# Patient Record
Sex: FEMALE | Race: OTHER | HISPANIC OR LATINO
[De-identification: names, ages, dates, MRNs, and addresses within clinical notes are randomized per-mention and may not be internally consistent; named-entity substitution may affect disease eponyms.]

---

## 2019-11-04 ENCOUNTER — APPOINTMENT (OUTPATIENT)
Dept: OTOLARYNGOLOGY | Facility: CLINIC | Age: 24
End: 2019-11-04

## 2019-11-04 PROBLEM — Z00.00 ENCOUNTER FOR PREVENTIVE HEALTH EXAMINATION: Status: ACTIVE | Noted: 2019-11-04

## 2019-11-26 ENCOUNTER — OUTPATIENT (OUTPATIENT)
Dept: OUTPATIENT SERVICES | Facility: HOSPITAL | Age: 24
LOS: 1 days | End: 2019-11-26
Payer: SELF-PAY

## 2019-11-26 ENCOUNTER — APPOINTMENT (OUTPATIENT)
Dept: ULTRASOUND IMAGING | Facility: CLINIC | Age: 24
End: 2019-11-26
Payer: COMMERCIAL

## 2019-11-26 DIAGNOSIS — N64.89 OTHER SPECIFIED DISORDERS OF BREAST: ICD-10-CM

## 2019-11-26 PROCEDURE — 76642 ULTRASOUND BREAST LIMITED: CPT | Mod: 26,LT

## 2019-11-26 PROCEDURE — 76642 ULTRASOUND BREAST LIMITED: CPT

## 2020-02-04 ENCOUNTER — APPOINTMENT (OUTPATIENT)
Dept: OTOLARYNGOLOGY | Facility: CLINIC | Age: 25
End: 2020-02-04
Payer: COMMERCIAL

## 2020-02-04 ENCOUNTER — OUTPATIENT (OUTPATIENT)
Dept: OUTPATIENT SERVICES | Facility: HOSPITAL | Age: 25
LOS: 1 days | Discharge: HOME | End: 2020-02-04

## 2020-02-04 VITALS
HEIGHT: 65 IN | HEART RATE: 92 BPM | DIASTOLIC BLOOD PRESSURE: 73 MMHG | WEIGHT: 112 LBS | SYSTOLIC BLOOD PRESSURE: 104 MMHG | BODY MASS INDEX: 18.66 KG/M2

## 2020-02-04 DIAGNOSIS — B96.89 PERSONAL HISTORY OF OTHER DISEASES OF THE RESPIRATORY SYSTEM: ICD-10-CM

## 2020-02-04 DIAGNOSIS — Z87.09 PERSONAL HISTORY OF OTHER DISEASES OF THE RESPIRATORY SYSTEM: ICD-10-CM

## 2020-02-04 DIAGNOSIS — Z78.9 OTHER SPECIFIED HEALTH STATUS: ICD-10-CM

## 2020-02-04 DIAGNOSIS — R22.1 LOCALIZED SWELLING, MASS AND LUMP, NECK: ICD-10-CM

## 2020-02-04 PROCEDURE — 31575 DIAGNOSTIC LARYNGOSCOPY: CPT

## 2020-02-04 PROCEDURE — 99204 OFFICE O/P NEW MOD 45 MIN: CPT | Mod: 25

## 2020-02-04 RX ORDER — AMOXICILLIN AND CLAVULANATE POTASSIUM 875; 125 MG/1; MG/1
875-125 TABLET, COATED ORAL TWICE DAILY
Qty: 20 | Refills: 0 | Status: ACTIVE | COMMUNITY
Start: 2020-02-04 | End: 1900-01-01

## 2020-02-04 RX ORDER — DOXYCLYCLINE HYCLATE 150 MG/1
TABLET, COATED ORAL
Refills: 0 | Status: ACTIVE | COMMUNITY

## 2020-02-04 NOTE — REASON FOR VISIT
[Initial Evaluation] : an initial evaluation for [[employer] ] : provided by [employer]   [Source: ______] : History obtained from [unfilled] [FreeTextEntry1] : 829898 [FreeTextEntry2] : Charlene [TWNoteComboBox1] : [place]

## 2020-02-04 NOTE — REVIEW OF SYSTEMS
[Sneezing] : sneezing [Throat Pain] : throat pain [Eye Pain] : eye pain [Negative] : Heme/Lymph [FreeTextEntry7] : dysphagia, odynophagia

## 2020-02-04 NOTE — PHYSICAL EXAM
[de-identified] : 2x2 cm soft mass along rt thyroid ala, may be attached to rt hyoid [de-identified] : pus in both MM [Normal] : orientation to person, place, and time: normal [FreeTextEntry2] : 2x2 cm soft mass along rt thyroid ala, may be attached to rt hyoid

## 2020-02-04 NOTE — PROCEDURE
[de-identified] : Fiberoptic Laryngoscopy (08801)\par \par Procedure performed: Fiberoptic Laryngeal Endoscopy - Diagnostic\par Pre-op/post op indication neck mass\par Patient was unable to cooperate with mirror. After informed verbal consent is obtained, the fiberoptic nasal endoscope # []  is passed viaboth nasal cavity. \par Findings: base of tongue and vallecula clear, hypopharynx normal without pooled secretions, crisp epiglottis, no evidence of laryngomalacia, bilateral vocal fold mobility full and symmetric. There was  some arytenoids edema and  erythema seen , without  mass or lesions.. Nasal cavity showed purulent secretions coming from both middle meati\par

## 2020-02-04 NOTE — HISTORY OF PRESENT ILLNESS
[de-identified] : 25 year old female here for lump in right side of throat.  States lump has been there for more than 4 years.  States ultrasound about 2.5 years ago and it has gotten bigger since.   Patient states dysphagia, odynophagia.  Denies dyspnea, dysphonia or otalgia.  Denies history of smoking or alcohol use.  Feels some pain on palpation no history of fevers or redness.  Also with history of a URI 1 to 2 weeks ago and now has some purulent secretions from her nose\par

## 2020-02-14 ENCOUNTER — FORM ENCOUNTER (OUTPATIENT)
Age: 25
End: 2020-02-14

## 2020-02-15 ENCOUNTER — OUTPATIENT (OUTPATIENT)
Dept: OUTPATIENT SERVICES | Facility: HOSPITAL | Age: 25
LOS: 1 days | End: 2020-02-15
Payer: COMMERCIAL

## 2020-02-15 ENCOUNTER — APPOINTMENT (OUTPATIENT)
Dept: CT IMAGING | Facility: CLINIC | Age: 25
End: 2020-02-15
Payer: COMMERCIAL

## 2020-02-15 PROCEDURE — 70491 CT SOFT TISSUE NECK W/DYE: CPT

## 2020-02-15 PROCEDURE — 70491 CT SOFT TISSUE NECK W/DYE: CPT | Mod: 26

## 2020-02-18 DIAGNOSIS — J01.90 ACUTE SINUSITIS, UNSPECIFIED: ICD-10-CM

## 2020-02-18 DIAGNOSIS — R22.1 LOCALIZED SWELLING, MASS AND LUMP, NECK: ICD-10-CM

## 2020-03-09 ENCOUNTER — APPOINTMENT (OUTPATIENT)
Dept: OTOLARYNGOLOGY | Facility: CLINIC | Age: 25
End: 2020-03-09

## 2020-03-12 ENCOUNTER — APPOINTMENT (OUTPATIENT)
Dept: DERMATOLOGY | Facility: HOSPITAL | Age: 25
End: 2020-03-12

## 2020-03-17 ENCOUNTER — APPOINTMENT (OUTPATIENT)
Dept: OTOLARYNGOLOGY | Facility: CLINIC | Age: 25
End: 2020-03-17

## 2020-12-23 PROBLEM — Z87.09 HISTORY OF ACUTE BACTERIAL SINUSITIS: Status: RESOLVED | Noted: 2020-02-04 | Resolved: 2020-12-23

## 2021-04-07 ENCOUNTER — TRANSCRIPTION ENCOUNTER (OUTPATIENT)
Age: 26
End: 2021-04-07

## 2021-05-29 ENCOUNTER — EMERGENCY (EMERGENCY)
Facility: HOSPITAL | Age: 26
LOS: 1 days | Discharge: HOME | End: 2021-05-29
Attending: EMERGENCY MEDICINE
Payer: SELF-PAY

## 2021-05-29 VITALS
HEART RATE: 98 BPM | TEMPERATURE: 98 F | OXYGEN SATURATION: 100 % | DIASTOLIC BLOOD PRESSURE: 73 MMHG | RESPIRATION RATE: 14 BRPM | HEIGHT: 64 IN | WEIGHT: 125 LBS | SYSTOLIC BLOOD PRESSURE: 112 MMHG

## 2021-05-29 PROCEDURE — 99283 EMERGENCY DEPT VISIT LOW MDM: CPT

## 2021-05-29 RX ORDER — CIPROFLOXACIN AND DEXAMETHASONE 3; 1 MG/ML; MG/ML
4 SUSPENSION/ DROPS AURICULAR (OTIC)
Qty: 1 | Refills: 0
Start: 2021-05-29 | End: 2021-06-04

## 2021-05-29 NOTE — ED STATDOCS - PATIENT PORTAL LINK FT
You can access the FollowMyHealth Patient Portal offered by [facility] by registering at the following website: [email or web address]. By joining [facility]’s FollowMyHealth portal, you will also be able to view your health information using other applications (apps) compatible with our system.

## 2021-05-29 NOTE — ED STATDOCS - OBJECTIVE STATEMENT
26 y/oF presents with c/o left ear pain and decreased hearing x 1 week with left sided sore throat.  Denies fever.  Used peroxide in ear without relief of symptoms 25 y/o F presents with c/o left ear pain and decreased hearing x 1 week with left sided sore throat.  Denies fever.  Used peroxide in ear without relief of symptoms

## 2021-05-29 NOTE — ED STATDOCS - NS ED ROS FT
General: no fever or chills  Eyes: no redness or discharge  ENT: no nasal congestion, left sided sore throat, left ear pain  Cardiac: no CP, no palpitations  Respiratory: No cough, SOB, CALZADA, wheeze  Abd: no abd pain, N/V/D  Skin: no itch or rash General: no fever or chills  Eyes: no redness or discharge  ENT: no nasal congestion, left sided sore throat, left ear pain  Cardiac: no CP, no palpitations  Respiratory: No cough, SOB, CALZADA, wheeze  Abd: no abd pain, N/V/D  Skin: no itch or rash.

## 2022-12-27 PROBLEM — Z78.9 OTHER SPECIFIED HEALTH STATUS: Chronic | Status: ACTIVE | Noted: 2021-05-29

## 2023-02-01 ENCOUNTER — APPOINTMENT (OUTPATIENT)
Dept: OBGYN | Facility: CLINIC | Age: 28
End: 2023-02-01

## 2025-01-13 ENCOUNTER — NON-APPOINTMENT (OUTPATIENT)
Age: 30
End: 2025-01-13

## 2025-01-14 ENCOUNTER — APPOINTMENT (OUTPATIENT)
Dept: OBGYN | Facility: CLINIC | Age: 30
End: 2025-01-14
Payer: COMMERCIAL

## 2025-01-14 ENCOUNTER — NON-APPOINTMENT (OUTPATIENT)
Age: 30
End: 2025-01-14

## 2025-01-14 VITALS
WEIGHT: 130 LBS | HEART RATE: 97 BPM | BODY MASS INDEX: 22.2 KG/M2 | SYSTOLIC BLOOD PRESSURE: 110 MMHG | RESPIRATION RATE: 16 BRPM | DIASTOLIC BLOOD PRESSURE: 72 MMHG | HEIGHT: 64 IN | OXYGEN SATURATION: 95 %

## 2025-01-14 DIAGNOSIS — N94.6 DYSMENORRHEA, UNSPECIFIED: ICD-10-CM

## 2025-01-14 DIAGNOSIS — Z11.3 ENCOUNTER FOR SCREENING FOR INFECTIONS WITH A PREDOMINANTLY SEXUAL MODE OF TRANSMISSION: ICD-10-CM

## 2025-01-14 DIAGNOSIS — N92.6 IRREGULAR MENSTRUATION, UNSPECIFIED: ICD-10-CM

## 2025-01-14 DIAGNOSIS — Z01.411 ENCOUNTER FOR GYNECOLOGICAL EXAMINATION (GENERAL) (ROUTINE) WITH ABNORMAL FINDINGS: ICD-10-CM

## 2025-01-14 PROCEDURE — 99204 OFFICE O/P NEW MOD 45 MIN: CPT | Mod: 25

## 2025-01-14 PROCEDURE — 99459 PELVIC EXAMINATION: CPT

## 2025-01-14 PROCEDURE — 99385 PREV VISIT NEW AGE 18-39: CPT

## 2025-01-15 LAB
C TRACH RRNA SPEC QL NAA+PROBE: NOT DETECTED
N GONORRHOEA RRNA SPEC QL NAA+PROBE: NOT DETECTED
SOURCE TP AMPLIFICATION: NORMAL

## 2025-01-21 LAB — CYTOLOGY CVX/VAG DOC THIN PREP: NORMAL

## 2025-01-22 LAB
ALBUMIN SERPL ELPH-MCNC: 4.4 G/DL
ALP BLD-CCNC: 61 U/L
ALT SERPL-CCNC: 11 U/L
ANION GAP SERPL CALC-SCNC: 12 MMOL/L
AST SERPL-CCNC: 11 U/L
BASOPHILS # BLD AUTO: 0.05 K/UL
BASOPHILS NFR BLD AUTO: 0.8 %
BILIRUB SERPL-MCNC: 0.2 MG/DL
BUN SERPL-MCNC: 11 MG/DL
CALCIUM SERPL-MCNC: 9.7 MG/DL
CHLORIDE SERPL-SCNC: 104 MMOL/L
CO2 SERPL-SCNC: 24 MMOL/L
CREAT SERPL-MCNC: 0.67 MG/DL
EGFR: 123 ML/MIN/1.73M2
EOSINOPHIL # BLD AUTO: 0.2 K/UL
EOSINOPHIL NFR BLD AUTO: 3.2 %
ESTRADIOL SERPL-MCNC: 27 PG/ML
FSH SERPL-MCNC: 5.5 IU/L
GLUCOSE SERPL-MCNC: 86 MG/DL
HCG SERPL-MCNC: <1 MIU/ML
HCT VFR BLD CALC: 39.1 %
HGB BLD-MCNC: 13.2 G/DL
IMM GRANULOCYTES NFR BLD AUTO: 0.2 %
LH SERPL-ACNC: 8.4 IU/L
LYMPHOCYTES # BLD AUTO: 2.37 K/UL
LYMPHOCYTES NFR BLD AUTO: 38.5 %
MAN DIFF?: NORMAL
MCHC RBC-ENTMCNC: 28.8 PG
MCHC RBC-ENTMCNC: 33.8 G/DL
MCV RBC AUTO: 85.4 FL
MONOCYTES # BLD AUTO: 0.55 K/UL
MONOCYTES NFR BLD AUTO: 8.9 %
NEUTROPHILS # BLD AUTO: 2.98 K/UL
NEUTROPHILS NFR BLD AUTO: 48.4 %
PLATELET # BLD AUTO: 272 K/UL
POTASSIUM SERPL-SCNC: 4 MMOL/L
PROGEST SERPL-MCNC: 1.2 NG/ML
PROLACTIN SERPL-MCNC: 10.8 NG/ML
PROT SERPL-MCNC: 7 G/DL
RBC # BLD: 4.58 M/UL
RBC # FLD: 12 %
SHBG SERPL-SCNC: 74.2 NMOL/L
SODIUM SERPL-SCNC: 139 MMOL/L
TSH SERPL-ACNC: 2.23 UIU/ML
WBC # FLD AUTO: 6.16 K/UL

## 2025-01-24 LAB
17OHP SERPL-MCNC: 29 NG/DL
ANDROST SERPL-MCNC: 92 NG/DL
TESTOST FREE SERPL-MCNC: 0.4 PG/ML
TESTOST SERPL-MCNC: 27.4 NG/DL

## 2025-01-29 ENCOUNTER — APPOINTMENT (OUTPATIENT)
Dept: MRI IMAGING | Facility: CLINIC | Age: 30
End: 2025-01-29
Payer: COMMERCIAL

## 2025-01-29 ENCOUNTER — TRANSCRIPTION ENCOUNTER (OUTPATIENT)
Age: 30
End: 2025-01-29

## 2025-01-29 ENCOUNTER — OUTPATIENT (OUTPATIENT)
Dept: OUTPATIENT SERVICES | Facility: HOSPITAL | Age: 30
LOS: 1 days | End: 2025-01-29
Payer: COMMERCIAL

## 2025-01-29 DIAGNOSIS — N94.6 DYSMENORRHEA, UNSPECIFIED: ICD-10-CM

## 2025-01-29 PROCEDURE — A9585: CPT

## 2025-01-29 PROCEDURE — 72197 MRI PELVIS W/O & W/DYE: CPT | Mod: 26

## 2025-01-29 PROCEDURE — 72197 MRI PELVIS W/O & W/DYE: CPT

## 2025-02-04 ENCOUNTER — APPOINTMENT (OUTPATIENT)
Dept: OBGYN | Facility: CLINIC | Age: 30
End: 2025-02-04
Payer: COMMERCIAL

## 2025-02-04 VITALS
BODY MASS INDEX: 22.2 KG/M2 | DIASTOLIC BLOOD PRESSURE: 62 MMHG | WEIGHT: 130 LBS | HEART RATE: 96 BPM | SYSTOLIC BLOOD PRESSURE: 104 MMHG | RESPIRATION RATE: 16 BRPM | HEIGHT: 64 IN | OXYGEN SATURATION: 100 %

## 2025-02-04 DIAGNOSIS — E28.2 POLYCYSTIC OVARIAN SYNDROME: ICD-10-CM

## 2025-02-04 DIAGNOSIS — N94.6 DYSMENORRHEA, UNSPECIFIED: ICD-10-CM

## 2025-02-04 PROCEDURE — 99214 OFFICE O/P EST MOD 30 MIN: CPT

## 2025-02-04 RX ORDER — NORGESTIMATE AND ETHINYL ESTRADIOL 7DAYSX3 LO
0.18/0.215/0.25 KIT ORAL DAILY
Qty: 3 | Refills: 3 | Status: ACTIVE | COMMUNITY
Start: 2025-02-04 | End: 1900-01-01

## 2025-07-16 ENCOUNTER — APPOINTMENT (OUTPATIENT)
Age: 30
End: 2025-07-16
Payer: COMMERCIAL

## 2025-07-16 VITALS
RESPIRATION RATE: 16 BRPM | OXYGEN SATURATION: 99 % | BODY MASS INDEX: 22.88 KG/M2 | DIASTOLIC BLOOD PRESSURE: 69 MMHG | WEIGHT: 134 LBS | SYSTOLIC BLOOD PRESSURE: 103 MMHG | HEIGHT: 64 IN | HEART RATE: 101 BPM | TEMPERATURE: 98.2 F

## 2025-07-16 LAB
HCG UR QL: NEGATIVE
QUALITY CONTROL: YES

## 2025-07-16 PROCEDURE — 81025 URINE PREGNANCY TEST: CPT

## 2025-07-16 PROCEDURE — 99214 OFFICE O/P EST MOD 30 MIN: CPT | Mod: 25

## 2025-07-17 RX ORDER — DROSPIRENONE AND ETHINYL ESTRADIOL 0.02-3(28)
3-0.02 KIT ORAL
Qty: 3 | Refills: 3 | Status: ACTIVE | COMMUNITY
Start: 2025-07-17 | End: 1900-01-01